# Patient Record
Sex: MALE | Race: WHITE | NOT HISPANIC OR LATINO
[De-identification: names, ages, dates, MRNs, and addresses within clinical notes are randomized per-mention and may not be internally consistent; named-entity substitution may affect disease eponyms.]

---

## 2019-04-16 ENCOUNTER — RX RENEWAL (OUTPATIENT)
Age: 64
End: 2019-04-16

## 2019-04-16 PROBLEM — Z00.00 ENCOUNTER FOR PREVENTIVE HEALTH EXAMINATION: Status: ACTIVE | Noted: 2019-04-16

## 2019-05-01 ENCOUNTER — RX RENEWAL (OUTPATIENT)
Age: 64
End: 2019-05-01

## 2019-07-17 ENCOUNTER — RECORD ABSTRACTING (OUTPATIENT)
Age: 64
End: 2019-07-17

## 2019-07-17 DIAGNOSIS — Z86.39 PERSONAL HISTORY OF OTHER ENDOCRINE, NUTRITIONAL AND METABOLIC DISEASE: ICD-10-CM

## 2019-07-17 DIAGNOSIS — Z80.51 FAMILY HISTORY OF MALIGNANT NEOPLASM OF KIDNEY: ICD-10-CM

## 2019-07-17 DIAGNOSIS — Z82.49 FAMILY HISTORY OF ISCHEMIC HEART DISEASE AND OTHER DISEASES OF THE CIRCULATORY SYSTEM: ICD-10-CM

## 2019-07-17 DIAGNOSIS — Z87.891 PERSONAL HISTORY OF NICOTINE DEPENDENCE: ICD-10-CM

## 2019-07-17 RX ORDER — MULTIVIT-MIN/FOLIC/VIT K/LYCOP 400-300MCG
TABLET ORAL
Refills: 0 | Status: ACTIVE | COMMUNITY

## 2019-08-01 ENCOUNTER — RX RENEWAL (OUTPATIENT)
Age: 64
End: 2019-08-01

## 2019-08-27 ENCOUNTER — APPOINTMENT (OUTPATIENT)
Dept: INTERNAL MEDICINE | Facility: CLINIC | Age: 64
End: 2019-08-27
Payer: COMMERCIAL

## 2019-08-27 ENCOUNTER — NON-APPOINTMENT (OUTPATIENT)
Age: 64
End: 2019-08-27

## 2019-08-27 VITALS
HEIGHT: 71 IN | WEIGHT: 188 LBS | SYSTOLIC BLOOD PRESSURE: 138 MMHG | BODY MASS INDEX: 26.32 KG/M2 | DIASTOLIC BLOOD PRESSURE: 72 MMHG

## 2019-08-27 DIAGNOSIS — J45.20 MILD INTERMITTENT ASTHMA, UNCOMPLICATED: ICD-10-CM

## 2019-08-27 DIAGNOSIS — Z12.11 ENCOUNTER FOR SCREENING FOR MALIGNANT NEOPLASM OF COLON: ICD-10-CM

## 2019-08-27 PROCEDURE — 99396 PREV VISIT EST AGE 40-64: CPT | Mod: 25

## 2019-08-27 PROCEDURE — 36415 COLL VENOUS BLD VENIPUNCTURE: CPT

## 2019-08-27 PROCEDURE — 93000 ELECTROCARDIOGRAM COMPLETE: CPT

## 2019-08-27 RX ORDER — FLUTICASONE PROPIONATE AND SALMETEROL 500; 50 UG/1; UG/1
500-50 POWDER RESPIRATORY (INHALATION) TWICE DAILY
Qty: 3 | Refills: 1 | Status: DISCONTINUED | COMMUNITY
Start: 2019-04-16 | End: 2019-08-27

## 2019-08-27 NOTE — PHYSICAL EXAM
[No Acute Distress] : no acute distress [Well Developed] : well developed [Well Nourished] : well nourished [Well-Appearing] : well-appearing [Normal Sclera/Conjunctiva] : normal sclera/conjunctiva [PERRL] : pupils equal round and reactive to light [EOMI] : extraocular movements intact [Normal Outer Ear/Nose] : the outer ears and nose were normal in appearance [Normal Oropharynx] : the oropharynx was normal [No JVD] : no jugular venous distention [Supple] : supple [No Lymphadenopathy] : no lymphadenopathy [No Accessory Muscle Use] : no accessory muscle use [Thyroid Normal, No Nodules] : the thyroid was normal and there were no nodules present [No Respiratory Distress] : no respiratory distress  [Normal Rate] : normal rate  [Clear to Auscultation] : lungs were clear to auscultation bilaterally [Regular Rhythm] : with a regular rhythm [Normal S1, S2] : normal S1 and S2 [No Carotid Bruits] : no carotid bruits [No Murmur] : no murmur heard [No Varicosities] : no varicosities [No Abdominal Bruit] : a ~M bruit was not heard ~T in the abdomen [No Edema] : there was no peripheral edema [Pedal Pulses Present] : the pedal pulses are present [No Palpable Aorta] : no palpable aorta [No Extremity Clubbing/Cyanosis] : no extremity clubbing/cyanosis [Non Tender] : non-tender [Soft] : abdomen soft [Non-distended] : non-distended [No Masses] : no abdominal mass palpated [No HSM] : no HSM [Normal Bowel Sounds] : normal bowel sounds [Normal Posterior Cervical Nodes] : no posterior cervical lymphadenopathy [Declined Rectal Exam] : declined rectal exam [Normal Anterior Cervical Nodes] : no anterior cervical lymphadenopathy [No CVA Tenderness] : no CVA  tenderness [No Joint Swelling] : no joint swelling [No Spinal Tenderness] : no spinal tenderness [Grossly Normal Strength/Tone] : grossly normal strength/tone [No Rash] : no rash [Coordination Grossly Intact] : coordination grossly intact [No Focal Deficits] : no focal deficits [Normal Gait] : normal gait [Normal Affect] : the affect was normal [Normal Insight/Judgement] : insight and judgment were intact [Normal TMs] : both tympanic membranes were normal [Alert and Oriented x3] : oriented to person, place, and time [Normal Mood] : the mood was normal

## 2019-08-27 NOTE — HEALTH RISK ASSESSMENT
[Very Good] : ~his/her~  mood as very good [Yes] : Yes [2 - 4 times a month (2 pts)] : 2-4 times a month (2 points) [1 or 2 (0 pts)] : 1 or 2 (0 points) [Never (0 pts)] : Never (0 points) [No] : In the past 12 months have you used drugs other than those required for medical reasons? No [No falls in past year] : Patient reported no falls in the past year [0] : 2) Feeling down, depressed, or hopeless: Not at all (0) [HIV Test offered] : HIV Test offered [Hepatitis C test offered] : Hepatitis C test offered [Patient reported colonoscopy was normal] : Patient reported colonoscopy was normal [FreeTextEntry1] : none [] : No [de-identified] : gym, weight [de-identified] : low carbs [ColonoscopyDate] : 01/19

## 2019-08-27 NOTE — HISTORY OF PRESENT ILLNESS
[FreeTextEntry1] : annual physical [de-identified] : Pt here for annual. He exercises. He walks, plays racquetball, weights. He has lost another 7 lb since last annual. Pt feels well overall. States his asthma is well controlled. He has not needed albuterol in about 7 months now.

## 2019-08-27 NOTE — COUNSELING
[de-identified] : Pt counseled on proper diet and exercise. We discussed the importance of exercise in maintaining a healthy life style.\par

## 2019-08-28 LAB
25(OH)D3 SERPL-MCNC: 28.8 NG/ML
ALBUMIN SERPL ELPH-MCNC: 4.9 G/DL
ALP BLD-CCNC: 72 U/L
ALT SERPL-CCNC: 17 U/L
ANION GAP SERPL CALC-SCNC: 17 MMOL/L
AST SERPL-CCNC: 22 U/L
BASOPHILS # BLD AUTO: 0.03 K/UL
BASOPHILS NFR BLD AUTO: 0.5 %
BILIRUB SERPL-MCNC: 0.5 MG/DL
BUN SERPL-MCNC: 17 MG/DL
CALCIUM SERPL-MCNC: 9.8 MG/DL
CHLORIDE SERPL-SCNC: 100 MMOL/L
CHOLEST SERPL-MCNC: 216 MG/DL
CHOLEST/HDLC SERPL: 2.6 RATIO
CO2 SERPL-SCNC: 21 MMOL/L
CREAT SERPL-MCNC: 1.12 MG/DL
EOSINOPHIL # BLD AUTO: 0.19 K/UL
EOSINOPHIL NFR BLD AUTO: 3.2 %
ESTIMATED AVERAGE GLUCOSE: 126 MG/DL
GLUCOSE SERPL-MCNC: 86 MG/DL
HBA1C MFR BLD HPLC: 6 %
HCT VFR BLD CALC: 40.9 %
HCV AB SER QL: NONREACTIVE
HCV S/CO RATIO: 0.13 S/CO
HDLC SERPL-MCNC: 84 MG/DL
HGB BLD-MCNC: 13.4 G/DL
HIV1+2 AB SPEC QL IA.RAPID: NONREACTIVE
IMM GRANULOCYTES NFR BLD AUTO: 0.3 %
LDLC SERPL CALC-MCNC: 113 MG/DL
LYMPHOCYTES # BLD AUTO: 1.69 K/UL
LYMPHOCYTES NFR BLD AUTO: 28.9 %
MAN DIFF?: NORMAL
MCHC RBC-ENTMCNC: 32.1 PG
MCHC RBC-ENTMCNC: 32.8 GM/DL
MCV RBC AUTO: 97.8 FL
MONOCYTES # BLD AUTO: 0.61 K/UL
MONOCYTES NFR BLD AUTO: 10.4 %
NEUTROPHILS # BLD AUTO: 3.31 K/UL
NEUTROPHILS NFR BLD AUTO: 56.7 %
PLATELET # BLD AUTO: 205 K/UL
POTASSIUM SERPL-SCNC: 4.7 MMOL/L
PROT SERPL-MCNC: 7.1 G/DL
PSA SERPL-MCNC: 2.37 NG/ML
RBC # BLD: 4.18 M/UL
RBC # FLD: 12.8 %
SODIUM SERPL-SCNC: 138 MMOL/L
TRIGL SERPL-MCNC: 97 MG/DL
TSH SERPL-ACNC: 1.16 UIU/ML
WBC # FLD AUTO: 5.85 K/UL

## 2019-10-25 ENCOUNTER — RX RENEWAL (OUTPATIENT)
Age: 64
End: 2019-10-25

## 2019-10-26 ENCOUNTER — RX RENEWAL (OUTPATIENT)
Age: 64
End: 2019-10-26

## 2019-10-28 ENCOUNTER — RX RENEWAL (OUTPATIENT)
Age: 64
End: 2019-10-28

## 2019-10-31 ENCOUNTER — RX RENEWAL (OUTPATIENT)
Age: 64
End: 2019-10-31

## 2020-02-03 ENCOUNTER — RX RENEWAL (OUTPATIENT)
Age: 65
End: 2020-02-03

## 2020-10-05 ENCOUNTER — NON-APPOINTMENT (OUTPATIENT)
Age: 65
End: 2020-10-05

## 2020-10-05 ENCOUNTER — APPOINTMENT (OUTPATIENT)
Dept: INTERNAL MEDICINE | Facility: CLINIC | Age: 65
End: 2020-10-05
Payer: COMMERCIAL

## 2020-10-05 VITALS
SYSTOLIC BLOOD PRESSURE: 140 MMHG | DIASTOLIC BLOOD PRESSURE: 72 MMHG | TEMPERATURE: 98.7 F | BODY MASS INDEX: 27.3 KG/M2 | WEIGHT: 195 LBS | HEIGHT: 71 IN

## 2020-10-05 PROCEDURE — 99396 PREV VISIT EST AGE 40-64: CPT | Mod: 25

## 2020-10-05 PROCEDURE — 36415 COLL VENOUS BLD VENIPUNCTURE: CPT

## 2020-10-05 PROCEDURE — 93000 ELECTROCARDIOGRAM COMPLETE: CPT

## 2020-10-05 PROCEDURE — 90686 IIV4 VACC NO PRSV 0.5 ML IM: CPT

## 2020-10-05 PROCEDURE — G0008: CPT

## 2020-10-05 NOTE — HISTORY OF PRESENT ILLNESS
[FreeTextEntry1] : physical  [de-identified] : Pt here for annual physical. Overall doing well. He has gained weight since last annual as he has been working from home. No specific complaints at present. He plans on resuming exercise.

## 2020-10-05 NOTE — HEALTH RISK ASSESSMENT
[Good] : ~his/her~  mood as  good [Yes] : Yes [2 - 4 times a month (2 pts)] : 2-4 times a month (2 points) [1 or 2 (0 pts)] : 1 or 2 (0 points) [Never (0 pts)] : Never (0 points) [No] : In the past 12 months have you used drugs other than those required for medical reasons? No [No falls in past year] : Patient reported no falls in the past year [0] : 2) Feeling down, depressed, or hopeless: Not at all (0) [HIV Test offered] : HIV Test offered [Hepatitis C test offered] : Hepatitis C test offered [Patient reported colonoscopy was normal] : Patient reported colonoscopy was normal [Employed] : employed [FreeTextEntry1] : none [] : No [de-identified] : yes  [de-identified] : regular diet  [ColonoscopyDate] : 09/17 [ColonoscopyComments] : Dr Cho, needs in 2022

## 2020-10-05 NOTE — PHYSICAL EXAM
[No Acute Distress] : no acute distress [Well Nourished] : well nourished [Well Developed] : well developed [Well-Appearing] : well-appearing [Normal Sclera/Conjunctiva] : normal sclera/conjunctiva [EOMI] : extraocular movements intact [Normal Outer Ear/Nose] : the outer ears and nose were normal in appearance [No JVD] : no jugular venous distention [No Lymphadenopathy] : no lymphadenopathy [Supple] : supple [Thyroid Normal, No Nodules] : the thyroid was normal and there were no nodules present [No Respiratory Distress] : no respiratory distress  [No Accessory Muscle Use] : no accessory muscle use [Clear to Auscultation] : lungs were clear to auscultation bilaterally [Normal Rate] : normal rate  [Regular Rhythm] : with a regular rhythm [Normal S1, S2] : normal S1 and S2 [No Murmur] : no murmur heard [No Carotid Bruits] : no carotid bruits [No Varicosities] : no varicosities [Pedal Pulses Present] : the pedal pulses are present [No Edema] : there was no peripheral edema [No Extremity Clubbing/Cyanosis] : no extremity clubbing/cyanosis [Soft] : abdomen soft [Non Tender] : non-tender [Non-distended] : non-distended [No Masses] : no abdominal mass palpated [No HSM] : no HSM [Normal Bowel Sounds] : normal bowel sounds [Normal Posterior Cervical Nodes] : no posterior cervical lymphadenopathy [Normal Anterior Cervical Nodes] : no anterior cervical lymphadenopathy [No CVA Tenderness] : no CVA  tenderness [No Spinal Tenderness] : no spinal tenderness [No Joint Swelling] : no joint swelling [Grossly Normal Strength/Tone] : grossly normal strength/tone [No Rash] : no rash [Coordination Grossly Intact] : coordination grossly intact [No Focal Deficits] : no focal deficits [Normal Gait] : normal gait [Normal Affect] : the affect was normal [Normal Insight/Judgement] : insight and judgment were intact [Declined Rectal Exam] : declined rectal exam [Alert and Oriented x3] : oriented to person, place, and time [Normal Mood] : the mood was normal [de-identified] : many moles, none suspicious

## 2020-10-06 LAB
BASOPHILS # BLD AUTO: 0.03 K/UL
BASOPHILS NFR BLD AUTO: 0.7 %
EOSINOPHIL # BLD AUTO: 0.11 K/UL
EOSINOPHIL NFR BLD AUTO: 2.4 %
ESTIMATED AVERAGE GLUCOSE: 128 MG/DL
HBA1C MFR BLD HPLC: 6.1 %
HCT VFR BLD CALC: 40.8 %
HGB BLD-MCNC: 13.3 G/DL
IMM GRANULOCYTES NFR BLD AUTO: 0.4 %
LYMPHOCYTES # BLD AUTO: 1.26 K/UL
LYMPHOCYTES NFR BLD AUTO: 27.6 %
MAN DIFF?: NORMAL
MCHC RBC-ENTMCNC: 31.9 PG
MCHC RBC-ENTMCNC: 32.6 GM/DL
MCV RBC AUTO: 97.8 FL
MONOCYTES # BLD AUTO: 0.52 K/UL
MONOCYTES NFR BLD AUTO: 11.4 %
NEUTROPHILS # BLD AUTO: 2.62 K/UL
NEUTROPHILS NFR BLD AUTO: 57.5 %
PLATELET # BLD AUTO: 209 K/UL
RBC # BLD: 4.17 M/UL
RBC # FLD: 12.7 %
SARS-COV-2 IGG SERPL IA-ACNC: 0.01 INDEX
SARS-COV-2 IGG SERPL QL IA: NEGATIVE
WBC # FLD AUTO: 4.56 K/UL

## 2020-10-08 LAB
ALBUMIN SERPL ELPH-MCNC: 4.4 G/DL
ALP BLD-CCNC: 83 U/L
ALT SERPL-CCNC: 21 U/L
ANION GAP SERPL CALC-SCNC: 20 MMOL/L
AST SERPL-CCNC: 21 U/L
BILIRUB SERPL-MCNC: 0.2 MG/DL
BUN SERPL-MCNC: 14 MG/DL
CALCIUM SERPL-MCNC: 9.5 MG/DL
CHLORIDE SERPL-SCNC: 103 MMOL/L
CHOLEST SERPL-MCNC: 183 MG/DL
CHOLEST/HDLC SERPL: 2.8 RATIO
CO2 SERPL-SCNC: 17 MMOL/L
CREAT SERPL-MCNC: 1.06 MG/DL
GLUCOSE SERPL-MCNC: 126 MG/DL
HDLC SERPL-MCNC: 65 MG/DL
LDLC SERPL CALC-MCNC: 96 MG/DL
POTASSIUM SERPL-SCNC: 4.3 MMOL/L
PROT SERPL-MCNC: 6.5 G/DL
PSA SERPL-MCNC: 2.26 NG/ML
SODIUM SERPL-SCNC: 140 MMOL/L
TRIGL SERPL-MCNC: 107 MG/DL
TSH SERPL-ACNC: 2.22 UIU/ML

## 2021-01-29 ENCOUNTER — RX RENEWAL (OUTPATIENT)
Age: 66
End: 2021-01-29

## 2021-09-03 ENCOUNTER — NON-APPOINTMENT (OUTPATIENT)
Age: 66
End: 2021-09-03

## 2021-09-03 ENCOUNTER — APPOINTMENT (OUTPATIENT)
Dept: INTERNAL MEDICINE | Facility: CLINIC | Age: 66
End: 2021-09-03
Payer: COMMERCIAL

## 2021-09-03 VITALS
HEIGHT: 71 IN | OXYGEN SATURATION: 98 % | WEIGHT: 198 LBS | HEART RATE: 59 BPM | SYSTOLIC BLOOD PRESSURE: 145 MMHG | DIASTOLIC BLOOD PRESSURE: 80 MMHG | BODY MASS INDEX: 27.72 KG/M2

## 2021-09-03 VITALS — SYSTOLIC BLOOD PRESSURE: 138 MMHG | DIASTOLIC BLOOD PRESSURE: 86 MMHG

## 2021-09-03 DIAGNOSIS — Z20.822 CONTACT WITH AND (SUSPECTED) EXPOSURE TO COVID-19: ICD-10-CM

## 2021-09-03 PROCEDURE — 36415 COLL VENOUS BLD VENIPUNCTURE: CPT

## 2021-09-03 PROCEDURE — 99214 OFFICE O/P EST MOD 30 MIN: CPT | Mod: 25

## 2021-09-03 NOTE — HISTORY OF PRESENT ILLNESS
[FreeTextEntry1] : follow up for cholesterol\par Needs a form filled out [de-identified] : Pt here today for follow up. He needs a form filled out for work. Needs to have his blood work done. Pt feels well overall. No complaints of chest pains or SOB. He exercises regularly and tries to consume proper diet. \par Pt with borderline high b/p today and it has been like this on past couple of visits. He is trying to consume low salt diet.

## 2021-09-06 LAB
ANION GAP SERPL CALC-SCNC: 13 MMOL/L
BUN SERPL-MCNC: 17 MG/DL
CALCIUM SERPL-MCNC: 9.5 MG/DL
CHLORIDE SERPL-SCNC: 103 MMOL/L
CHOLEST SERPL-MCNC: 196 MG/DL
CO2 SERPL-SCNC: 26 MMOL/L
CREAT SERPL-MCNC: 1.21 MG/DL
ESTIMATED AVERAGE GLUCOSE: 128 MG/DL
GLUCOSE SERPL-MCNC: 118 MG/DL
HBA1C MFR BLD HPLC: 6.1 %
HDLC SERPL-MCNC: 69 MG/DL
LDLC SERPL CALC-MCNC: 100 MG/DL
NONHDLC SERPL-MCNC: 127 MG/DL
POTASSIUM SERPL-SCNC: 4.4 MMOL/L
SODIUM SERPL-SCNC: 141 MMOL/L
TRIGL SERPL-MCNC: 136 MG/DL

## 2021-10-25 ENCOUNTER — RX RENEWAL (OUTPATIENT)
Age: 66
End: 2021-10-25

## 2022-01-24 ENCOUNTER — RX RENEWAL (OUTPATIENT)
Age: 67
End: 2022-01-24

## 2022-07-18 ENCOUNTER — NON-APPOINTMENT (OUTPATIENT)
Age: 67
End: 2022-07-18

## 2022-07-21 ENCOUNTER — NON-APPOINTMENT (OUTPATIENT)
Age: 67
End: 2022-07-21

## 2022-07-21 ENCOUNTER — APPOINTMENT (OUTPATIENT)
Dept: INTERNAL MEDICINE | Facility: CLINIC | Age: 67
End: 2022-07-21

## 2022-07-21 VITALS
SYSTOLIC BLOOD PRESSURE: 136 MMHG | BODY MASS INDEX: 27.3 KG/M2 | HEIGHT: 71 IN | OXYGEN SATURATION: 97 % | HEART RATE: 58 BPM | TEMPERATURE: 98.4 F | WEIGHT: 195 LBS | DIASTOLIC BLOOD PRESSURE: 74 MMHG

## 2022-07-21 PROCEDURE — 36415 COLL VENOUS BLD VENIPUNCTURE: CPT

## 2022-07-21 PROCEDURE — G0009: CPT | Mod: 59

## 2022-07-21 PROCEDURE — 99397 PER PM REEVAL EST PAT 65+ YR: CPT | Mod: 25

## 2022-07-21 PROCEDURE — 90677 PCV20 VACCINE IM: CPT

## 2022-07-21 PROCEDURE — 90472 IMMUNIZATION ADMIN EACH ADD: CPT

## 2022-07-21 PROCEDURE — 93000 ELECTROCARDIOGRAM COMPLETE: CPT

## 2022-07-21 RX ORDER — ZOSTER VACCINE RECOMBINANT, ADJUVANTED 50 MCG/0.5
50 KIT INTRAMUSCULAR
Qty: 1 | Refills: 1 | Status: DISCONTINUED | COMMUNITY
Start: 2019-08-27 | End: 2022-07-21

## 2022-07-21 NOTE — HISTORY OF PRESENT ILLNESS
[FreeTextEntry1] : physical  [de-identified] : Pt here for annual physical. He walks, plays racquetball, strength training at the GYM. No cardiac complaints. He feels well overall. \par Asthma has been very well controlled. No specific complaints at present.

## 2022-07-21 NOTE — HEALTH RISK ASSESSMENT
[Good] : ~his/her~ current health as good [Very Good] : ~his/her~  mood as very good [Former] : Former [Yes] : Yes [2 - 4 times a month (2 pts)] : 2-4 times a month (2 points) [1 or 2 (0 pts)] : 1 or 2 (0 points) [Never (0 pts)] : Never (0 points) [No] : In the past 12 months have you used drugs other than those required for medical reasons? No [No falls in past year] : Patient reported no falls in the past year [0] : 2) Feeling down, depressed, or hopeless: Not at all (0) [Patient reported colonoscopy was normal] : Patient reported colonoscopy was normal [HIV Test offered] : HIV Test offered [Hepatitis C test offered] : Hepatitis C test offered [None] : None [With Significant Other] : lives with significant other [Employed] : employed [Feels Safe at Home] : Feels safe at home [Fully functional (bathing, dressing, toileting, transferring, walking, feeding)] : Fully functional (bathing, dressing, toileting, transferring, walking, feeding) [Fully functional (using the telephone, shopping, preparing meals, housekeeping, doing laundry, using] : Fully functional and needs no help or supervision to perform IADLs (using the telephone, shopping, preparing meals, housekeeping, doing laundry, using transportation, managing medications and managing finances) [Significant Other] : lives with significant other [# Of Children ___] : has [unfilled] children [FreeTextEntry1] : none [de-identified] : gym, walking [de-identified] : regular diet  [[id number]] : 0 [ColonoscopyDate] : 09/17 [ColonoscopyComments] : Dr Cho, needs in 2022 [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

## 2022-07-21 NOTE — PHYSICAL EXAM
[Well Nourished] : well nourished [Well Developed] : well developed [Well-Appearing] : well-appearing [Normal Sclera/Conjunctiva] : normal sclera/conjunctiva [EOMI] : extraocular movements intact [Normal Outer Ear/Nose] : the outer ears and nose were normal in appearance [No JVD] : no jugular venous distention [No Lymphadenopathy] : no lymphadenopathy [Supple] : supple [Thyroid Normal, No Nodules] : the thyroid was normal and there were no nodules present [No Respiratory Distress] : no respiratory distress  [No Accessory Muscle Use] : no accessory muscle use [Clear to Auscultation] : lungs were clear to auscultation bilaterally [Normal Rate] : normal rate  [Regular Rhythm] : with a regular rhythm [Normal S1, S2] : normal S1 and S2 [No Murmur] : no murmur heard [No Carotid Bruits] : no carotid bruits [Pedal Pulses Present] : the pedal pulses are present [No Edema] : there was no peripheral edema [Soft] : abdomen soft [Non Tender] : non-tender [Non-distended] : non-distended [No Masses] : no abdominal mass palpated [No HSM] : no HSM [Normal Bowel Sounds] : normal bowel sounds [Declined Rectal Exam] : declined rectal exam [Normal Posterior Cervical Nodes] : no posterior cervical lymphadenopathy [Normal Anterior Cervical Nodes] : no anterior cervical lymphadenopathy [No CVA Tenderness] : no CVA  tenderness [No Spinal Tenderness] : no spinal tenderness [No Joint Swelling] : no joint swelling [Grossly Normal Strength/Tone] : grossly normal strength/tone [No Rash] : no rash [Coordination Grossly Intact] : coordination grossly intact [No Focal Deficits] : no focal deficits [Normal Gait] : normal gait [Normal Affect] : the affect was normal [Alert and Oriented x3] : oriented to person, place, and time [Normal Mood] : the mood was normal [Normal Insight/Judgement] : insight and judgment were intact [No Acute Distress] : no acute distress [Normal TMs] : both tympanic membranes were normal [de-identified] : many moles, none are suspicious

## 2022-07-22 LAB
ALBUMIN SERPL ELPH-MCNC: 4.8 G/DL
ALP BLD-CCNC: 92 U/L
ALT SERPL-CCNC: 26 U/L
ANION GAP SERPL CALC-SCNC: 14 MMOL/L
AST SERPL-CCNC: 26 U/L
BASOPHILS # BLD AUTO: 0.03 K/UL
BASOPHILS NFR BLD AUTO: 0.5 %
BILIRUB SERPL-MCNC: 0.5 MG/DL
BUN SERPL-MCNC: 19 MG/DL
CALCIUM SERPL-MCNC: 9.8 MG/DL
CHLORIDE SERPL-SCNC: 102 MMOL/L
CHOLEST SERPL-MCNC: 222 MG/DL
CO2 SERPL-SCNC: 24 MMOL/L
CREAT SERPL-MCNC: 1.29 MG/DL
EGFR: 61 ML/MIN/1.73M2
EOSINOPHIL # BLD AUTO: 0.26 K/UL
EOSINOPHIL NFR BLD AUTO: 4.6 %
ESTIMATED AVERAGE GLUCOSE: 131 MG/DL
GLUCOSE SERPL-MCNC: 120 MG/DL
HBA1C MFR BLD HPLC: 6.2 %
HCT VFR BLD CALC: 41.4 %
HDLC SERPL-MCNC: 68 MG/DL
HGB BLD-MCNC: 14.1 G/DL
IMM GRANULOCYTES NFR BLD AUTO: 0.4 %
LDLC SERPL CALC-MCNC: 107 MG/DL
LYMPHOCYTES # BLD AUTO: 1.61 K/UL
LYMPHOCYTES NFR BLD AUTO: 28.7 %
MAN DIFF?: NORMAL
MCHC RBC-ENTMCNC: 33.1 PG
MCHC RBC-ENTMCNC: 34.1 GM/DL
MCV RBC AUTO: 97.2 FL
MONOCYTES # BLD AUTO: 0.54 K/UL
MONOCYTES NFR BLD AUTO: 9.6 %
NEUTROPHILS # BLD AUTO: 3.15 K/UL
NEUTROPHILS NFR BLD AUTO: 56.2 %
NONHDLC SERPL-MCNC: 154 MG/DL
PLATELET # BLD AUTO: 228 K/UL
POTASSIUM SERPL-SCNC: 4.5 MMOL/L
PROT SERPL-MCNC: 7.1 G/DL
PSA SERPL-MCNC: 3.11 NG/ML
RBC # BLD: 4.26 M/UL
RBC # FLD: 12.8 %
SODIUM SERPL-SCNC: 140 MMOL/L
TRIGL SERPL-MCNC: 235 MG/DL
TSH SERPL-ACNC: 1.89 UIU/ML
WBC # FLD AUTO: 5.61 K/UL

## 2022-10-17 ENCOUNTER — APPOINTMENT (OUTPATIENT)
Dept: PODIATRY | Facility: CLINIC | Age: 67
End: 2022-10-17

## 2022-10-17 VITALS — BODY MASS INDEX: 27.3 KG/M2 | WEIGHT: 195 LBS | HEIGHT: 71 IN

## 2022-10-17 PROCEDURE — 99203 OFFICE O/P NEW LOW 30 MIN: CPT

## 2022-10-17 PROCEDURE — 73630 X-RAY EXAM OF FOOT: CPT | Mod: LT

## 2022-10-17 NOTE — PHYSICAL EXAM
[General Appearance - Alert] : alert [General Appearance - In No Acute Distress] : in no acute distress [No Joint Swelling] : no joint swelling [Normal Foot/Ankle] : Both lower extremities were exposed and visualized. Standing exam demonstrates normal foot posture and alignment. Hindfoot exam shows no hindfoot valgus or varus [Skin Color & Pigmentation] : normal skin color and pigmentation [Skin Turgor] : normal skin turgor [] : no rash [Skin Lesions] : no skin lesions [Sensation] : the sensory exam was normal to light touch and pinprick [No Focal Deficits] : no focal deficits [Deep Tendon Reflexes (DTR)] : deep tendon reflexes were 2+ and symmetric [Motor Exam] : the motor exam was normal [Oriented To Time, Place, And Person] : oriented to person, place, and time [Impaired Insight] : insight and judgment were intact [Affect] : the affect was normal [FreeTextEntry1] : Vascular exam reveals palpable pedal pulses, the foot is warm to touch, there was good capillary fill time, the skin is normal in appearance there is no evidence of vascular disease or compromise at this time [de-identified] : PUP of the area below the 2nd met headleft foot\par s and sx c/w LMB [Foot Ulcer] : no foot ulcer [Skin Induration] : no skin induration

## 2022-10-17 NOTE — REVIEW OF SYSTEMS
[As Noted in HPI] : as noted in HPI [Negative] : Heme/Lymph [Joint Swelling] : no joint swelling [Joint Stiffness] : no joint stiffness [Limb Swelling] : no limb swelling

## 2022-10-17 NOTE — PROCEDURE
[FreeTextEntry1] : X-rays were taken in the office multiple views of the left foot\par xrays ++ for flattening of the 2nd met head\par \par Based on my physical examination and my clinical findings and the patient's description of the symptoms, a complete differential diagnosis was reviewed with the patient. Possible diagnoses as well as treatment options explained in great detail. All questions asked and answered appropriately.\par \par I had a lengthy discussion with the patient regarding bursitis the etiology and treatment options. I did explain to the patient what this means in medical as well as lay in terms and treatment options for bursitis. Since bursitis is an inflammation treatment options were reviewed including but were not limited to physical therapy, anti-inflammatory medication by mouth, steroid injection locally, offloading with orthotics, padding to the area, or a combination of all the above. The discussion with the patient regarding treatment options with and complete with all questions asked and answered appropriately.\par \par A complete and thorough evaluation of the type of shoes they should be wearing and type of shoes for this time of year was discussed with patient.\par \par I have applied offloading pads to the patient's foot and have given them several samples to continue to use. I have also advised the patient that they can certainly purchase these from an outside vendor and if they are willing to do that and the name and number was supplied\par During the evaluation and management I had a lengthy discussion with the patient regarding benefits of functional foot orthoses. I explained to the patient the etiology and treatment options and one of them included the offloading and balancing of the painful portion of the foot. I explained the importance of balancing in offloading the painful area as part of the overall treatment process to advance healing. I have asked the patient to consider this as part of the treatment\par Since the patient is currently taking non-steroidal anti-inflammatory medication I had a complete and thorough discussion with the patient regarding risks and benefits of these types of medications. The most common side effects include but are not limited to gastrointestinal upset, blood in the stool, nausea, diarrhea, as well as tinnitus. I have advised the patient that if they should experience any of the side effects that she discontinue them at once and contacted primary care physician for immediate followup. I did advise the patient that these types of medications can be taken on an as needed basis and if they are not having pain he should not take them. I also advised that the patient should follow the explicit instructions on the labile and not to exceed the recommended dosage.\par

## 2022-10-17 NOTE — HISTORY OF PRESENT ILLNESS
[FreeTextEntry1] : Location: plantar aspect of left foot\par Duration: several weeks\par Etiology: unknown-no hx of injury or trauma--works from home\par Past Tx: [facility] orthotics\par Exacerbated by: weight bearing\par Prior Hx:no\par

## 2022-11-07 ENCOUNTER — APPOINTMENT (OUTPATIENT)
Dept: PODIATRY | Facility: CLINIC | Age: 67
End: 2022-11-07

## 2022-11-07 VITALS — WEIGHT: 195 LBS | HEIGHT: 71 IN | BODY MASS INDEX: 27.3 KG/M2

## 2022-11-07 PROCEDURE — 99213 OFFICE O/P EST LOW 20 MIN: CPT

## 2022-11-07 NOTE — PHYSICAL EXAM
[No Joint Swelling] : no joint swelling [Normal Foot/Ankle] : Both lower extremities were exposed and visualized. Standing exam demonstrates normal foot posture and alignment. Hindfoot exam shows no hindfoot valgus or varus [Skin Color & Pigmentation] : normal skin color and pigmentation [Skin Turgor] : normal skin turgor [] : no rash [Skin Lesions] : no skin lesions [FreeTextEntry1] : Vascular exam reveals palpable pedal pulses, the foot is warm to touch, there was good capillary fill time, the skin is normal in appearance there is no evidence of vascular disease or compromise at this time [de-identified] : PUP of the area below the 2nd met headleft foot\par s and sx c/w LMB\par mildly improved [Foot Ulcer] : no foot ulcer [Skin Induration] : no skin induration

## 2022-11-07 NOTE — HISTORY OF PRESENT ILLNESS
[FreeTextEntry1] : Location: plantar aspect of left foot\par Duration: several weeks\par Etiology: unknown-no hx of injury or trauma--works from home\par Past Tx: [facility] orthotics\par Exacerbated by: weight bearing\par Prior Hx:no\par \par my recent treatment :\par nsaids and pads\par states he is 30% improved

## 2022-11-07 NOTE — REVIEW OF SYSTEMS
[As Noted in HPI] : as noted in HPI [Negative] : Integumentary [Leg Claudication] : no intermittent leg claudication [Lower Ext Edema] : no extremity edema [Joint Swelling] : no joint swelling [Joint Stiffness] : no joint stiffness [Limb Pain] : no limb pain [Limb Swelling] : no limb swelling

## 2022-11-07 NOTE — PROCEDURE
[FreeTextEntry1] : had a lengthy discussion with the patient regarding the diagnosis etiology and differential diagnosis as well as treatment options for the presenting problem. Risks alternatives and benefits of treatment ranging from conservative to surgical explained in great detail. I also explained the progression of treatment from conservative to possible surgical treatment options as well as the benefits of each. I do stress conservative treatment if in fact conservative treatment is an option until it no longer provides relief. Over-the-counter products medications padding, and splinting were reviewed as well. All questions asked and answered appropriately to the patient's satisfaction\par During the evaluation and management I had a lengthy discussion with the patient regarding benefits of functional foot orthoses. I explained to the patient the etiology and treatment options and one of them included the offloading and balancing of the painful portion of the foot. I explained the importance of balancing in offloading the painful area as part of the overall treatment process to advance healing. I have asked the patient to consider this as part of the treatment\par \par continue nsaids\par will get insurance approval for orthotics\par \par A lengthy discussion with the patient regarding risks and benefits of steroid injection. I explained to the patient that in the natural progression of orthopedic type problems oral anti-inflammatories and injectable anti-inflammatory medication or an integral part of the treatment process. I did explain to the patient some of the risks associated with steroid injection included but were not limited to infection at the puncture site, discoloration of skin, the condition being no better, and most importantly the risk of soft tissue injury. I did explain to the patient, in certain rare occasions, there is soft tissue damage including but not limited to atrophy of the soft tissue, necrosis of the tissue, rupture of the ligament, and or possibly rupture of the tendon.\par After weighing the risks alternatives and benefits to the injection the patient decided to hold off for now and attempt other options first. All questions asked and answered appropriately.\par

## 2022-11-12 ENCOUNTER — RESULT REVIEW (OUTPATIENT)
Age: 67
End: 2022-11-12

## 2022-11-14 ENCOUNTER — RESULT REVIEW (OUTPATIENT)
Age: 67
End: 2022-11-14

## 2022-11-15 ENCOUNTER — APPOINTMENT (OUTPATIENT)
Dept: GASTROENTEROLOGY | Facility: HOSPITAL | Age: 67
End: 2022-11-15

## 2022-12-07 ENCOUNTER — RX RENEWAL (OUTPATIENT)
Age: 67
End: 2022-12-07

## 2022-12-15 ENCOUNTER — RX RENEWAL (OUTPATIENT)
Age: 67
End: 2022-12-15

## 2022-12-15 RX ORDER — ALBUTEROL SULFATE 90 UG/1
108 (90 BASE) INHALANT RESPIRATORY (INHALATION)
Qty: 1 | Refills: 2 | Status: ACTIVE | COMMUNITY
Start: 2022-12-15 | End: 1900-01-01

## 2023-03-29 ENCOUNTER — NON-APPOINTMENT (OUTPATIENT)
Age: 68
End: 2023-03-29

## 2023-04-17 ENCOUNTER — APPOINTMENT (OUTPATIENT)
Dept: INTERNAL MEDICINE | Facility: CLINIC | Age: 68
End: 2023-04-17
Payer: MEDICARE

## 2023-04-17 ENCOUNTER — LABORATORY RESULT (OUTPATIENT)
Age: 68
End: 2023-04-17

## 2023-04-17 VITALS
BODY MASS INDEX: 26.6 KG/M2 | HEIGHT: 71 IN | OXYGEN SATURATION: 99 % | HEART RATE: 63 BPM | SYSTOLIC BLOOD PRESSURE: 130 MMHG | DIASTOLIC BLOOD PRESSURE: 82 MMHG | WEIGHT: 190 LBS

## 2023-04-17 DIAGNOSIS — Z23 ENCOUNTER FOR IMMUNIZATION: ICD-10-CM

## 2023-04-17 PROCEDURE — 36415 COLL VENOUS BLD VENIPUNCTURE: CPT

## 2023-04-17 PROCEDURE — 99214 OFFICE O/P EST MOD 30 MIN: CPT | Mod: 25

## 2023-04-17 RX ORDER — ASCORBIC ACID 500 MG
TABLET ORAL DAILY
Refills: 0 | Status: ACTIVE | COMMUNITY

## 2023-04-17 NOTE — PHYSICAL EXAM
[No JVD] : no jugular venous distention [No Edema] : there was no peripheral edema [Normal] : no posterior cervical lymphadenopathy and no anterior cervical lymphadenopathy [No Joint Swelling] : no joint swelling [Grossly Normal Strength/Tone] : grossly normal strength/tone [No Rash] : no rash [Coordination Grossly Intact] : coordination grossly intact [No Focal Deficits] : no focal deficits [Normal Gait] : normal gait [Normal Affect] : the affect was normal [Normal Insight/Judgement] : insight and judgment were intact

## 2023-04-17 NOTE — HISTORY OF PRESENT ILLNESS
[FreeTextEntry8] : Pt is very sore and tight in calves, shoulders. Some times he has a hard time even getting out of a chair. About 4.5 weeks ago he had a cold..It subsided, but the following day he had gout in left ankle, lateral malleolus region. He took advil but it did not subside. He went to Urgent Care. He was given a 5 day course of prednisone and was given colchicine. The gout subsided after the prednisone. so he did not take the prednisone. . After that everything aches. No chest pain at all, no difficulty breathing. No fever. No URI at present.  He gets checked for COVID twice a week and has been negative. \par Has not been bitten by any ticks.\par Pt states that he feels better in the morning and as the day progresses he feels worse. \par No warmth or swelling in the joints.

## 2023-04-17 NOTE — HEALTH RISK ASSESSMENT
[Yes] : Yes [2 - 4 times a month (2 pts)] : 2-4 times a month (2 points) [1 or 2 (0 pts)] : 1 or 2 (0 points) [No] : In the past 12 months have you used drugs other than those required for medical reasons? No [Never (0 pts)] : Never (0 points) [No falls in past year] : Patient reported no falls in the past year [0] : 2) Feeling down, depressed, or hopeless: Not at all (0) [PHQ-2 Negative - No further assessment needed] : PHQ-2 Negative - No further assessment needed [Former] : Former [> 15 Years] : > 15 Years [de-identified] : gym, walking [de-identified] : regular diet  [[id number]] : 0

## 2023-04-17 NOTE — REVIEW OF SYSTEMS
[Joint Pain] : joint pain [Muscle Pain] : muscle pain [Joint Swelling] : joint swelling [Negative] : Heme/Lymph [Fever] : no fever [Fatigue] : no fatigue

## 2023-04-18 DIAGNOSIS — R73.03 PREDIABETES.: ICD-10-CM

## 2023-04-18 LAB
ALBUMIN SERPL ELPH-MCNC: 4.3 G/DL
ALP BLD-CCNC: 121 U/L
ALT SERPL-CCNC: 20 U/L
ANION GAP SERPL CALC-SCNC: 14 MMOL/L
AST SERPL-CCNC: 14 U/L
BILIRUB SERPL-MCNC: 0.2 MG/DL
BUN SERPL-MCNC: 17 MG/DL
CALCIUM SERPL-MCNC: 9.6 MG/DL
CHLORIDE SERPL-SCNC: 100 MMOL/L
CHOLEST SERPL-MCNC: 176 MG/DL
CK SERPL-CCNC: 152 U/L
CO2 SERPL-SCNC: 24 MMOL/L
CREAT SERPL-MCNC: 1.08 MG/DL
CRP SERPL-MCNC: 22 MG/L
EGFR: 75 ML/MIN/1.73M2
ERYTHROCYTE [SEDIMENTATION RATE] IN BLOOD BY WESTERGREN METHOD: 33 MM/HR
ESTIMATED AVERAGE GLUCOSE: 148 MG/DL
FOLATE SERPL-MCNC: 6.6 NG/ML
GLUCOSE SERPL-MCNC: 129 MG/DL
HBA1C MFR BLD HPLC: 6.8 %
HCT VFR BLD CALC: 39.1 %
HDLC SERPL-MCNC: 61 MG/DL
HGB BLD-MCNC: 12.3 G/DL
LDLC SERPL CALC-MCNC: 94 MG/DL
MCHC RBC-ENTMCNC: 31.5 GM/DL
MCHC RBC-ENTMCNC: 31.6 PG
MCV RBC AUTO: 100.5 FL
NONHDLC SERPL-MCNC: 115 MG/DL
PLATELET # BLD AUTO: 313 K/UL
POTASSIUM SERPL-SCNC: 4.4 MMOL/L
PROT SERPL-MCNC: 7 G/DL
RAPID RVP RESULT: NOT DETECTED
RBC # BLD: 3.89 M/UL
RBC # FLD: 12.8 %
RHEUMATOID FACT SER QL: 21 IU/ML
SARS-COV-2 RNA PNL RESP NAA+PROBE: NOT DETECTED
SODIUM SERPL-SCNC: 138 MMOL/L
TRIGL SERPL-MCNC: 103 MG/DL
TSH SERPL-ACNC: 1.81 UIU/ML
URATE SERPL-MCNC: 5.8 MG/DL
VIT B12 SERPL-MCNC: 483 PG/ML
WBC # FLD AUTO: 6.6 K/UL

## 2023-04-18 RX ORDER — COLCHICINE 0.6 MG/1
0.6 TABLET ORAL
Qty: 5 | Refills: 0 | Status: COMPLETED | COMMUNITY
Start: 2023-03-30

## 2023-04-18 RX ORDER — PREDNISONE 50 MG/1
50 TABLET ORAL
Qty: 5 | Refills: 0 | Status: COMPLETED | COMMUNITY
Start: 2023-03-30

## 2023-04-19 LAB — ANA SER IF-ACNC: NEGATIVE

## 2023-04-21 ENCOUNTER — APPOINTMENT (OUTPATIENT)
Dept: RHEUMATOLOGY | Facility: CLINIC | Age: 68
End: 2023-04-21
Payer: MEDICARE

## 2023-04-21 PROCEDURE — 99204 OFFICE O/P NEW MOD 45 MIN: CPT

## 2023-04-21 NOTE — HISTORY OF PRESENT ILLNESS
[FreeTextEntry1] : 69yo M with PMHx gout, dm, hld in the office for evaluation\par \par \par history:\par for the past 1.5 months with acute episodes of gout\par left ankle / podagra\par pain, ttp, redness, swelling\par NSAIDs with no response\par steroid taper course with improvement of symptoms\par workup elevated ESR, RF+, normal CK\par started on metformin for DM\par arthralgias including shoulder discomfort\par \par Gout:\par first attack in 2011\par podagra attack\par since then chronic attacks\par for the past 3 years less than 1 per year\par initial on allopurinol\par uric acid check 5.8 last

## 2023-04-24 LAB
CCP AB SER IA-ACNC: <8 UNITS
RF+CCP IGG SER-IMP: NEGATIVE
RHEUMATOID FACT SER QL: 20 IU/ML

## 2023-05-16 ENCOUNTER — RX RENEWAL (OUTPATIENT)
Age: 68
End: 2023-05-16

## 2023-07-12 ENCOUNTER — RX RENEWAL (OUTPATIENT)
Age: 68
End: 2023-07-12

## 2023-07-31 ENCOUNTER — APPOINTMENT (OUTPATIENT)
Dept: INTERNAL MEDICINE | Facility: CLINIC | Age: 68
End: 2023-07-31
Payer: MEDICARE

## 2023-07-31 VITALS
HEART RATE: 52 BPM | HEIGHT: 71 IN | WEIGHT: 182 LBS | SYSTOLIC BLOOD PRESSURE: 122 MMHG | TEMPERATURE: 98.7 F | DIASTOLIC BLOOD PRESSURE: 70 MMHG | BODY MASS INDEX: 25.48 KG/M2 | OXYGEN SATURATION: 96 %

## 2023-07-31 PROCEDURE — 99214 OFFICE O/P EST MOD 30 MIN: CPT | Mod: 25

## 2023-07-31 PROCEDURE — 36415 COLL VENOUS BLD VENIPUNCTURE: CPT

## 2023-07-31 RX ORDER — AMOXICILLIN 500 MG/1
500 TABLET, FILM COATED ORAL
Qty: 21 | Refills: 0 | Status: COMPLETED | COMMUNITY
Start: 2023-07-12

## 2023-07-31 RX ORDER — FLUTICASONE PROPIONATE AND SALMETEROL 250; 50 UG/1; UG/1
250-50 POWDER RESPIRATORY (INHALATION)
Qty: 3 | Refills: 1 | Status: DISCONTINUED | COMMUNITY
Start: 2021-10-25 | End: 2023-07-31

## 2023-07-31 RX ORDER — DICLOFENAC SODIUM 75 MG/1
75 TABLET, DELAYED RELEASE ORAL
Qty: 60 | Refills: 0 | Status: DISCONTINUED | COMMUNITY
Start: 2022-10-17 | End: 2023-07-31

## 2023-07-31 RX ORDER — COLCHICINE 0.6 MG/1
0.6 TABLET ORAL
Qty: 30 | Refills: 0 | Status: DISCONTINUED | COMMUNITY
Start: 2023-04-21 | End: 2023-07-31

## 2023-07-31 RX ORDER — IBUPROFEN 600 MG/1
600 TABLET, FILM COATED ORAL
Qty: 28 | Refills: 0 | Status: COMPLETED | COMMUNITY
Start: 2023-07-12

## 2023-07-31 RX ORDER — MELOXICAM 15 MG/1
15 TABLET ORAL
Qty: 30 | Refills: 0 | Status: DISCONTINUED | COMMUNITY
Start: 2023-04-19 | End: 2023-07-31

## 2023-07-31 NOTE — HEALTH RISK ASSESSMENT
[Yes] : Yes [2 - 3 times a week (3 pts)] : 2 - 3  times a week (3 points) [1 or 2 (0 pts)] : 1 or 2 (0 points) [Never (0 pts)] : Never (0 points) [No] : In the past 12 months have you used drugs other than those required for medical reasons? No [No falls in past year] : Patient reported no falls in the past year [0] : 2) Feeling down, depressed, or hopeless: Not at all (0) [Former] : Former [de-identified] : gym  [de-identified] : regular diet  [[id number]] : 0

## 2023-07-31 NOTE — COUNSELING
[FreeTextEntry2] : Pt counseled on proper diet and exercise. We discussed the importance of exercise in maintaining a healthy life style.

## 2023-07-31 NOTE — HISTORY OF PRESENT ILLNESS
[FreeTextEntry1] : follow-up  [de-identified] : Pt here for follow up visit. He feels well. He has no complaints at present. He has lost 8 lb by improved diet and exercise. He has seen rheum. no longer having joint or muscle pains.

## 2023-08-01 LAB
ALBUMIN SERPL ELPH-MCNC: 4.8 G/DL
ALP BLD-CCNC: 70 U/L
ALT SERPL-CCNC: 15 U/L
ANION GAP SERPL CALC-SCNC: 13 MMOL/L
AST SERPL-CCNC: 20 U/L
BILIRUB SERPL-MCNC: 0.4 MG/DL
BUN SERPL-MCNC: 22 MG/DL
CALCIUM SERPL-MCNC: 10.4 MG/DL
CHLORIDE SERPL-SCNC: 101 MMOL/L
CHOLEST SERPL-MCNC: 211 MG/DL
CO2 SERPL-SCNC: 25 MMOL/L
CREAT SERPL-MCNC: 1.18 MG/DL
CREAT SPEC-SCNC: 122 MG/DL
EGFR: 68 ML/MIN/1.73M2
ESTIMATED AVERAGE GLUCOSE: 123 MG/DL
GLUCOSE SERPL-MCNC: 115 MG/DL
HBA1C MFR BLD HPLC: 5.9 %
HDLC SERPL-MCNC: 77 MG/DL
LDLC SERPL CALC-MCNC: 116 MG/DL
MICROALBUMIN 24H UR DL<=1MG/L-MCNC: <1.2 MG/DL
MICROALBUMIN/CREAT 24H UR-RTO: NORMAL MG/G
NONHDLC SERPL-MCNC: 134 MG/DL
POTASSIUM SERPL-SCNC: 5.2 MMOL/L
PROT SERPL-MCNC: 7.2 G/DL
SODIUM SERPL-SCNC: 139 MMOL/L
TRIGL SERPL-MCNC: 105 MG/DL

## 2023-09-08 ENCOUNTER — APPOINTMENT (OUTPATIENT)
Dept: RHEUMATOLOGY | Facility: CLINIC | Age: 68
End: 2023-09-08

## 2023-09-28 ENCOUNTER — APPOINTMENT (OUTPATIENT)
Dept: RHEUMATOLOGY | Facility: CLINIC | Age: 68
End: 2023-09-28

## 2023-10-07 ENCOUNTER — RX RENEWAL (OUTPATIENT)
Age: 68
End: 2023-10-07

## 2024-01-01 ENCOUNTER — RX RENEWAL (OUTPATIENT)
Age: 69
End: 2024-01-01

## 2024-01-11 ENCOUNTER — RX RENEWAL (OUTPATIENT)
Age: 69
End: 2024-01-11

## 2024-01-11 RX ORDER — ATORVASTATIN CALCIUM 20 MG/1
20 TABLET, FILM COATED ORAL
Qty: 90 | Refills: 3 | Status: ACTIVE | COMMUNITY
Start: 2019-05-01 | End: 1900-01-01

## 2024-02-12 ENCOUNTER — APPOINTMENT (OUTPATIENT)
Dept: INTERNAL MEDICINE | Facility: CLINIC | Age: 69
End: 2024-02-12
Payer: MEDICARE

## 2024-02-12 ENCOUNTER — NON-APPOINTMENT (OUTPATIENT)
Age: 69
End: 2024-02-12

## 2024-02-12 VITALS
DIASTOLIC BLOOD PRESSURE: 70 MMHG | BODY MASS INDEX: 24.36 KG/M2 | HEIGHT: 71 IN | WEIGHT: 174 LBS | SYSTOLIC BLOOD PRESSURE: 126 MMHG

## 2024-02-12 DIAGNOSIS — E78.00 PURE HYPERCHOLESTEROLEMIA, UNSPECIFIED: ICD-10-CM

## 2024-02-12 DIAGNOSIS — M77.52 OTHER ENTHESOPATHY OF LT FOOT AND ANKLE: ICD-10-CM

## 2024-02-12 DIAGNOSIS — Z87.39 PERSONAL HISTORY OF OTHER DISEASES OF THE MUSCULOSKELETAL SYSTEM AND CONNECTIVE TISSUE: ICD-10-CM

## 2024-02-12 DIAGNOSIS — E66.3 OVERWEIGHT: ICD-10-CM

## 2024-02-12 DIAGNOSIS — Z00.00 ENCOUNTER FOR GENERAL ADULT MEDICAL EXAMINATION W/OUT ABNORMAL FINDINGS: ICD-10-CM

## 2024-02-12 DIAGNOSIS — M25.50 PAIN IN UNSPECIFIED JOINT: ICD-10-CM

## 2024-02-12 DIAGNOSIS — R03.0 ELEVATED BLOOD-PRESSURE READING, W/OUT DIAGNOSIS OF HYPERTENSION: ICD-10-CM

## 2024-02-12 DIAGNOSIS — Z12.5 ENCOUNTER FOR SCREENING FOR MALIGNANT NEOPLASM OF PROSTATE: ICD-10-CM

## 2024-02-12 DIAGNOSIS — E11.9 TYPE 2 DIABETES MELLITUS W/OUT COMPLICATIONS: ICD-10-CM

## 2024-02-12 DIAGNOSIS — M10.9 GOUT, UNSPECIFIED: ICD-10-CM

## 2024-02-12 DIAGNOSIS — J45.909 UNSPECIFIED ASTHMA, UNCOMPLICATED: ICD-10-CM

## 2024-02-12 DIAGNOSIS — M79.672 PAIN IN LEFT FOOT: ICD-10-CM

## 2024-02-12 PROCEDURE — 93000 ELECTROCARDIOGRAM COMPLETE: CPT

## 2024-02-12 PROCEDURE — 36415 COLL VENOUS BLD VENIPUNCTURE: CPT

## 2024-02-12 PROCEDURE — 99213 OFFICE O/P EST LOW 20 MIN: CPT | Mod: 25

## 2024-02-12 PROCEDURE — G0438: CPT

## 2024-02-12 RX ORDER — FLUTICASONE PROPIONATE AND SALMETEROL 100; 50 UG/1; UG/1
100-50 POWDER RESPIRATORY (INHALATION)
Qty: 3 | Refills: 3 | Status: ACTIVE | COMMUNITY
Start: 2019-10-28 | End: 1900-01-01

## 2024-02-12 NOTE — HISTORY OF PRESENT ILLNESS
[PMH Reviewed and Updated] : past medical history reviewed and updated [PSH Reviewed and Updated] : past surgical history reviewed and updated [Medication and Allergies Reconciled] : medication and allergies reconciled [0] : 0 [Over the Past 2 Weeks, Have You Felt Down, Depressed, or Hopeless?] : 1.) Over the past 2 weeks, have you felt down, depressed, or hopeless? No [Over the Past 2 Weeks, Have You Felt Little Interest or Pleasure Doing Things?] : 2.) Over the past 2 weeks, have you felt little interest or pleasure doing things? No [Domestic Partner] : domestic partner [Retired] : retired from work [Smoking Status Reviewed and Updated] : Smoking status was reviewed and updated [None] : The patient has no concerns about alcohol abuse [Never] : has never used illicit drugs [Compliant with medications] : compliant with medications [Adequate] : adequate [Fully Independent] : fully independent [Drives without concerns] : drives without concerns [No history of falls] : no history of falls [Seatbelts] : seatbelts [FreeTextEntry1] : annual physical [de-identified] : Pt here for annual physical. Feeling well overall. He is exercising and consuming a healthy diet. No cardiac complaints.  He is no longer having joint pains. It was thought to be due to gout and he takes colchicine as needed.

## 2024-02-12 NOTE — HEALTH RISK ASSESSMENT
[Patient reported colonoscopy was normal] : Patient reported colonoscopy was normal [HIV Test offered] : HIV Test offered [Hepatitis C test offered] : Hepatitis C test offered [None] : None [With Significant Other] : lives with significant other [Employed] : employed [Significant Other] : lives with significant other [# Of Children ___] : has [unfilled] children [Feels Safe at Home] : Feels safe at home [Fully functional (bathing, dressing, toileting, transferring, walking, feeding)] : Fully functional (bathing, dressing, toileting, transferring, walking, feeding) [Fully functional (using the telephone, shopping, preparing meals, housekeeping, doing laundry, using] : Fully functional and needs no help or supervision to perform IADLs (using the telephone, shopping, preparing meals, housekeeping, doing laundry, using transportation, managing medications and managing finances) [Good] : ~his/her~  mood as  good [Yes] : Yes [2 - 3 times a week (3 pts)] : 2 - 3  times a week (3 points) [3 or 4 (1 pt)] : 3 or 4  (1 point) [Never (0 pts)] : Never (0 points) [No falls in past year] : Patient reported no falls in the past year [0] : 2) Feeling down, depressed, or hopeless: Not at all (0) [PHQ-2 Negative - No further assessment needed] : PHQ-2 Negative - No further assessment needed [Former] : Former [[id number]] : 0 [ColonoscopyDate] : 11/22 [ColonoscopyComments] : Dr Cho, needs in 2027 [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

## 2024-02-12 NOTE — PHYSICAL EXAM
[No Acute Distress] : no acute distress [Well Nourished] : well nourished [Well Developed] : well developed [Well-Appearing] : well-appearing [Normal Sclera/Conjunctiva] : normal sclera/conjunctiva [Normal Outer Ear/Nose] : the outer ears and nose were normal in appearance [No JVD] : no jugular venous distention [No Lymphadenopathy] : no lymphadenopathy [Supple] : supple [Thyroid Normal, No Nodules] : the thyroid was normal and there were no nodules present [No Respiratory Distress] : no respiratory distress  [No Accessory Muscle Use] : no accessory muscle use [Clear to Auscultation] : lungs were clear to auscultation bilaterally [Normal Rate] : normal rate  [Regular Rhythm] : with a regular rhythm [Normal S1, S2] : normal S1 and S2 [No Carotid Bruits] : no carotid bruits [No Murmur] : no murmur heard [Pedal Pulses Present] : the pedal pulses are present [No Edema] : there was no peripheral edema [Soft] : abdomen soft [Non Tender] : non-tender [Non-distended] : non-distended [No Masses] : no abdominal mass palpated [No HSM] : no HSM [Normal Bowel Sounds] : normal bowel sounds [Declined Rectal Exam] : declined rectal exam [Normal Posterior Cervical Nodes] : no posterior cervical lymphadenopathy [Normal Anterior Cervical Nodes] : no anterior cervical lymphadenopathy [No CVA Tenderness] : no CVA  tenderness [No Spinal Tenderness] : no spinal tenderness [No Joint Swelling] : no joint swelling [Grossly Normal Strength/Tone] : grossly normal strength/tone [No Rash] : no rash [Coordination Grossly Intact] : coordination grossly intact [No Focal Deficits] : no focal deficits [Normal Gait] : normal gait [Normal Affect] : the affect was normal [Alert and Oriented x3] : oriented to person, place, and time [Normal Mood] : the mood was normal [Normal Insight/Judgement] : insight and judgment were intact [de-identified] : many moles, none are suspicious

## 2024-02-13 LAB
ALBUMIN SERPL ELPH-MCNC: 4.5 G/DL
ALP BLD-CCNC: 80 U/L
ALT SERPL-CCNC: 14 U/L
ANION GAP SERPL CALC-SCNC: 11 MMOL/L
AST SERPL-CCNC: 21 U/L
BILIRUB SERPL-MCNC: 0.5 MG/DL
BUN SERPL-MCNC: 17 MG/DL
CALCIUM SERPL-MCNC: 9.5 MG/DL
CHLORIDE SERPL-SCNC: 102 MMOL/L
CHOLEST SERPL-MCNC: 194 MG/DL
CO2 SERPL-SCNC: 27 MMOL/L
CREAT SERPL-MCNC: 1.2 MG/DL
EGFR: 66 ML/MIN/1.73M2
ESTIMATED AVERAGE GLUCOSE: 126 MG/DL
GLUCOSE SERPL-MCNC: 129 MG/DL
HBA1C MFR BLD HPLC: 6 %
HCT VFR BLD CALC: 39.1 %
HDLC SERPL-MCNC: 74 MG/DL
HGB BLD-MCNC: 13.1 G/DL
LDLC SERPL CALC-MCNC: 101 MG/DL
MCHC RBC-ENTMCNC: 32.1 PG
MCHC RBC-ENTMCNC: 33.5 GM/DL
MCV RBC AUTO: 95.8 FL
NONHDLC SERPL-MCNC: 120 MG/DL
PLATELET # BLD AUTO: 225 K/UL
POTASSIUM SERPL-SCNC: 4.6 MMOL/L
PROT SERPL-MCNC: 6.8 G/DL
PSA SERPL-MCNC: 5.48 NG/ML
RBC # BLD: 4.08 M/UL
RBC # FLD: 13.2 %
SODIUM SERPL-SCNC: 140 MMOL/L
TRIGL SERPL-MCNC: 108 MG/DL
TSH SERPL-ACNC: 1.66 UIU/ML
URATE SERPL-MCNC: 6.6 MG/DL
WBC # FLD AUTO: 5.07 K/UL

## 2024-04-29 ENCOUNTER — TRANSCRIPTION ENCOUNTER (OUTPATIENT)
Age: 69
End: 2024-04-29

## 2024-06-23 ENCOUNTER — RX RENEWAL (OUTPATIENT)
Age: 69
End: 2024-06-23

## 2024-06-23 RX ORDER — METFORMIN HYDROCHLORIDE 500 MG/1
500 TABLET, COATED ORAL
Qty: 180 | Refills: 1 | Status: ACTIVE | COMMUNITY
Start: 2023-04-18 | End: 1900-01-01

## 2024-08-13 ENCOUNTER — NON-APPOINTMENT (OUTPATIENT)
Age: 69
End: 2024-08-13

## 2025-01-03 ENCOUNTER — RX RENEWAL (OUTPATIENT)
Age: 70
End: 2025-01-03

## 2025-02-24 ENCOUNTER — NON-APPOINTMENT (OUTPATIENT)
Age: 70
End: 2025-02-24

## 2025-02-24 ENCOUNTER — APPOINTMENT (OUTPATIENT)
Dept: INTERNAL MEDICINE | Facility: CLINIC | Age: 70
End: 2025-02-24
Payer: MEDICARE

## 2025-02-24 VITALS
BODY MASS INDEX: 24.49 KG/M2 | WEIGHT: 173 LBS | SYSTOLIC BLOOD PRESSURE: 130 MMHG | HEIGHT: 70.5 IN | OXYGEN SATURATION: 98 % | DIASTOLIC BLOOD PRESSURE: 80 MMHG | HEART RATE: 58 BPM

## 2025-02-24 VITALS
HEIGHT: 71 IN | BODY MASS INDEX: 24.22 KG/M2 | DIASTOLIC BLOOD PRESSURE: 82 MMHG | SYSTOLIC BLOOD PRESSURE: 130 MMHG | WEIGHT: 173 LBS

## 2025-02-24 DIAGNOSIS — Z12.5 ENCOUNTER FOR SCREENING FOR MALIGNANT NEOPLASM OF PROSTATE: ICD-10-CM

## 2025-02-24 DIAGNOSIS — J45.909 UNSPECIFIED ASTHMA, UNCOMPLICATED: ICD-10-CM

## 2025-02-24 DIAGNOSIS — E11.9 TYPE 2 DIABETES MELLITUS W/OUT COMPLICATIONS: ICD-10-CM

## 2025-02-24 DIAGNOSIS — R03.0 ELEVATED BLOOD-PRESSURE READING, W/OUT DIAGNOSIS OF HYPERTENSION: ICD-10-CM

## 2025-02-24 DIAGNOSIS — H40.9 UNSPECIFIED GLAUCOMA: ICD-10-CM

## 2025-02-24 DIAGNOSIS — M10.9 GOUT, UNSPECIFIED: ICD-10-CM

## 2025-02-24 DIAGNOSIS — E78.00 PURE HYPERCHOLESTEROLEMIA, UNSPECIFIED: ICD-10-CM

## 2025-02-24 PROCEDURE — G0439: CPT

## 2025-02-24 PROCEDURE — 36415 COLL VENOUS BLD VENIPUNCTURE: CPT

## 2025-02-24 PROCEDURE — 99213 OFFICE O/P EST LOW 20 MIN: CPT | Mod: 25

## 2025-02-24 PROCEDURE — 93000 ELECTROCARDIOGRAM COMPLETE: CPT

## 2025-02-24 RX ORDER — DORZOLAMIDE HYDROCHLORIDE TIMOLOL MALEATE 20; 5 MG/ML; MG/ML
2-0.5 SOLUTION/ DROPS OPHTHALMIC TWICE DAILY
Qty: 1 | Refills: 0 | Status: ACTIVE | COMMUNITY
Start: 2025-02-24

## 2025-02-24 RX ORDER — BLOOD PRESSURE TEST KIT-LARGE
KIT MISCELLANEOUS
Qty: 1 | Refills: 0 | Status: ACTIVE | COMMUNITY
Start: 2025-02-24 | End: 1900-01-01

## 2025-02-25 ENCOUNTER — TRANSCRIPTION ENCOUNTER (OUTPATIENT)
Age: 70
End: 2025-02-25

## 2025-02-25 LAB
ALBUMIN SERPL ELPH-MCNC: 4.5 G/DL
ALP BLD-CCNC: 80 U/L
ALT SERPL-CCNC: 17 U/L
ANION GAP SERPL CALC-SCNC: 12 MMOL/L
AST SERPL-CCNC: 26 U/L
BILIRUB SERPL-MCNC: 0.4 MG/DL
BUN SERPL-MCNC: 17 MG/DL
CALCIUM SERPL-MCNC: 9.6 MG/DL
CHLORIDE SERPL-SCNC: 101 MMOL/L
CHOLEST SERPL-MCNC: 178 MG/DL
CO2 SERPL-SCNC: 28 MMOL/L
CREAT SERPL-MCNC: 1.17 MG/DL
EGFR: 68 ML/MIN/1.73M2
ESTIMATED AVERAGE GLUCOSE: 128 MG/DL
GLUCOSE SERPL-MCNC: 116 MG/DL
HBA1C MFR BLD HPLC: 6.1 %
HCT VFR BLD CALC: 40.3 %
HDLC SERPL-MCNC: 72 MG/DL
HGB BLD-MCNC: 13.1 G/DL
LDLC SERPL CALC-MCNC: 90 MG/DL
MCHC RBC-ENTMCNC: 32 PG
MCHC RBC-ENTMCNC: 32.5 G/DL
MCV RBC AUTO: 98.5 FL
NONHDLC SERPL-MCNC: 105 MG/DL
PLATELET # BLD AUTO: 257 K/UL
POTASSIUM SERPL-SCNC: 5 MMOL/L
PROT SERPL-MCNC: 6.7 G/DL
PSA SERPL-MCNC: 3.86 NG/ML
RBC # BLD: 4.09 M/UL
RBC # FLD: 13.4 %
SODIUM SERPL-SCNC: 141 MMOL/L
TRIGL SERPL-MCNC: 87 MG/DL
TSH SERPL-ACNC: 1.51 UIU/ML
URATE SERPL-MCNC: 6.7 MG/DL
WBC # FLD AUTO: 4.83 K/UL

## 2025-04-30 ENCOUNTER — RX RENEWAL (OUTPATIENT)
Age: 70
End: 2025-04-30

## 2025-04-30 RX ORDER — FLUTICASONE PROPIONATE AND SALMETEROL 100; 50 UG/1; UG/1
100-50 POWDER RESPIRATORY (INHALATION)
Qty: 180 | Refills: 3 | Status: ACTIVE | COMMUNITY
Start: 2025-04-30 | End: 1900-01-01

## 2025-09-17 ENCOUNTER — RX RENEWAL (OUTPATIENT)
Age: 70
End: 2025-09-17